# Patient Record
Sex: FEMALE | Race: WHITE | NOT HISPANIC OR LATINO | Employment: OTHER | URBAN - METROPOLITAN AREA
[De-identification: names, ages, dates, MRNs, and addresses within clinical notes are randomized per-mention and may not be internally consistent; named-entity substitution may affect disease eponyms.]

---

## 2018-03-21 ENCOUNTER — NEW PATIENT COMPREHENSIVE (OUTPATIENT)
Dept: URBAN - METROPOLITAN AREA CLINIC 35 | Facility: CLINIC | Age: 73
End: 2018-03-21

## 2018-03-21 DIAGNOSIS — H25.041: ICD-10-CM

## 2018-03-21 DIAGNOSIS — H25.13: ICD-10-CM

## 2018-03-21 DIAGNOSIS — H43.812: ICD-10-CM

## 2018-03-21 DIAGNOSIS — H43.811: ICD-10-CM

## 2018-03-21 PROCEDURE — 1036F TOBACCO NON-USER: CPT

## 2018-03-21 PROCEDURE — 92310-2 LEVEL 2 CONTACT LENS MANAGEMENT

## 2018-03-21 PROCEDURE — 92015 DETERMINE REFRACTIVE STATE: CPT

## 2018-03-21 PROCEDURE — 92134 CPTRZ OPH DX IMG PST SGM RTA: CPT

## 2018-03-21 PROCEDURE — 99204 OFFICE O/P NEW MOD 45 MIN: CPT

## 2018-03-21 PROCEDURE — G8428 CUR MEDS NOT DOCUMENT: HCPCS

## 2018-03-21 ASSESSMENT — VISUAL ACUITY
OS_SC: 20/50-2
OD_CC: 20/30-2
OS_CC: 20/60+2
OS_CC: J3
OS_PH: 20/40
OD_CC: J6
OD_SC: 20/100

## 2018-03-21 ASSESSMENT — KERATOMETRY
OS_K1POWER_DIOPTERS: 44.00
OD_K2POWER_DIOPTERS: 44.25
OD_K1POWER_DIOPTERS: 43.50
OS_AXISANGLE2_DEGREES: 107
OD_AXISANGLE2_DEGREES: 74
OS_K2POWER_DIOPTERS: 44.50

## 2018-03-21 ASSESSMENT — TONOMETRY
OD_IOP_MMHG: 14
OS_IOP_MMHG: 12

## 2018-03-30 ENCOUNTER — RX CHANGE - NO APPT (OUTPATIENT)
Dept: URBAN - METROPOLITAN AREA CLINIC 35 | Facility: CLINIC | Age: 73
End: 2018-03-30

## 2018-03-30 ASSESSMENT — KERATOMETRY
OS_K1POWER_DIOPTERS: 44.00
OD_AXISANGLE2_DEGREES: 74
OD_K1POWER_DIOPTERS: 43.50
OS_AXISANGLE2_DEGREES: 107
OS_K2POWER_DIOPTERS: 44.50
OD_K2POWER_DIOPTERS: 44.25

## 2019-04-09 ENCOUNTER — ESTABLISHED COMPREHENSIVE EXAM (OUTPATIENT)
Dept: URBAN - METROPOLITAN AREA CLINIC 35 | Facility: CLINIC | Age: 74
End: 2019-04-09

## 2019-04-09 DIAGNOSIS — H25.13: ICD-10-CM

## 2019-04-09 DIAGNOSIS — H52.4: ICD-10-CM

## 2019-04-09 DIAGNOSIS — H52.03: ICD-10-CM

## 2019-04-09 DIAGNOSIS — H43.811: ICD-10-CM

## 2019-04-09 DIAGNOSIS — H43.812: ICD-10-CM

## 2019-04-09 PROCEDURE — 92134 CPTRZ OPH DX IMG PST SGM RTA: CPT

## 2019-04-09 PROCEDURE — 92015 DETERMINE REFRACTIVE STATE: CPT

## 2019-04-09 PROCEDURE — 92310-1 LEVEL 1 CONTACT LENS MANAGEMENT

## 2019-04-09 PROCEDURE — 92014 COMPRE OPH EXAM EST PT 1/>: CPT

## 2019-04-09 ASSESSMENT — VISUAL ACUITY
OD_CC: J1
OD_SC: 20/100+1
OS_CC: 20/100-1
OS_BAT: 20/100
OD_CC: 20/60
OS_PH: 20/40-2
OS_SC: 20/40
OS_CC: J1
OD_BAT: 20/100

## 2019-04-09 ASSESSMENT — TONOMETRY
OS_IOP_MMHG: 16
OD_IOP_MMHG: 16

## 2019-04-09 ASSESSMENT — KERATOMETRY
OD_AXISANGLE2_DEGREES: 74
OS_AXISANGLE2_DEGREES: 107
OD_K1POWER_DIOPTERS: 43.50
OS_K1POWER_DIOPTERS: 44.00
OS_K2POWER_DIOPTERS: 44.50
OD_K2POWER_DIOPTERS: 44.25

## 2019-07-15 ENCOUNTER — APPOINTMENT (RX ONLY)
Dept: RURAL CLINIC 1 | Facility: CLINIC | Age: 74
Setting detail: DERMATOLOGY
End: 2019-07-15

## 2019-07-15 DIAGNOSIS — L57.0 ACTINIC KERATOSIS: ICD-10-CM

## 2019-07-15 DIAGNOSIS — D22 MELANOCYTIC NEVI: ICD-10-CM

## 2019-07-15 DIAGNOSIS — Z71.89 OTHER SPECIFIED COUNSELING: ICD-10-CM

## 2019-07-15 DIAGNOSIS — Z85.828 PERSONAL HISTORY OF OTHER MALIGNANT NEOPLASM OF SKIN: ICD-10-CM

## 2019-07-15 DIAGNOSIS — L82.1 OTHER SEBORRHEIC KERATOSIS: ICD-10-CM

## 2019-07-15 DIAGNOSIS — L81.4 OTHER MELANIN HYPERPIGMENTATION: ICD-10-CM

## 2019-07-15 DIAGNOSIS — D18.0 HEMANGIOMA: ICD-10-CM

## 2019-07-15 DIAGNOSIS — L85.3 XEROSIS CUTIS: ICD-10-CM

## 2019-07-15 PROBLEM — D22.72 MELANOCYTIC NEVI OF LEFT LOWER LIMB, INCLUDING HIP: Status: ACTIVE | Noted: 2019-07-15

## 2019-07-15 PROBLEM — D18.01 HEMANGIOMA OF SKIN AND SUBCUTANEOUS TISSUE: Status: ACTIVE | Noted: 2019-07-15

## 2019-07-15 PROBLEM — D22.71 MELANOCYTIC NEVI OF RIGHT LOWER LIMB, INCLUDING HIP: Status: ACTIVE | Noted: 2019-07-15

## 2019-07-15 PROBLEM — I10 ESSENTIAL (PRIMARY) HYPERTENSION: Status: ACTIVE | Noted: 2019-07-15

## 2019-07-15 PROBLEM — E13.9 OTHER SPECIFIED DIABETES MELLITUS WITHOUT COMPLICATIONS: Status: ACTIVE | Noted: 2019-07-15

## 2019-07-15 PROBLEM — D22.39 MELANOCYTIC NEVI OF OTHER PARTS OF FACE: Status: ACTIVE | Noted: 2019-07-15

## 2019-07-15 PROBLEM — D22.62 MELANOCYTIC NEVI OF LEFT UPPER LIMB, INCLUDING SHOULDER: Status: ACTIVE | Noted: 2019-07-15

## 2019-07-15 PROBLEM — D22.5 MELANOCYTIC NEVI OF TRUNK: Status: ACTIVE | Noted: 2019-07-15

## 2019-07-15 PROCEDURE — 99203 OFFICE O/P NEW LOW 30 MIN: CPT | Mod: 25

## 2019-07-15 PROCEDURE — ? LIQUID NITROGEN

## 2019-07-15 PROCEDURE — ? COUNSELING

## 2019-07-15 PROCEDURE — 17004 DESTROY PREMAL LESIONS 15/>: CPT

## 2019-07-15 PROCEDURE — ? INVENTORY

## 2019-07-15 PROCEDURE — ? TREATMENT REGIMEN

## 2019-07-15 ASSESSMENT — LOCATION ZONE DERM
LOCATION ZONE: FACE
LOCATION ZONE: HAND
LOCATION ZONE: LEG
LOCATION ZONE: NOSE
LOCATION ZONE: ARM
LOCATION ZONE: EAR
LOCATION ZONE: TRUNK

## 2019-07-15 ASSESSMENT — LOCATION SIMPLE DESCRIPTION DERM
LOCATION SIMPLE: RIGHT FOREHEAD
LOCATION SIMPLE: RIGHT HAND
LOCATION SIMPLE: LEFT UPPER ARM
LOCATION SIMPLE: LEFT CLAVICULAR SKIN
LOCATION SIMPLE: CHEST
LOCATION SIMPLE: LEFT EAR
LOCATION SIMPLE: UPPER BACK
LOCATION SIMPLE: RIGHT FOREARM
LOCATION SIMPLE: LEFT CHEEK
LOCATION SIMPLE: LEFT THIGH
LOCATION SIMPLE: RIGHT WRIST
LOCATION SIMPLE: RIGHT THIGH
LOCATION SIMPLE: RIGHT UPPER BACK
LOCATION SIMPLE: RIGHT UPPER ARM
LOCATION SIMPLE: RIGHT LOWER BACK
LOCATION SIMPLE: LEFT ELBOW
LOCATION SIMPLE: ABDOMEN
LOCATION SIMPLE: RIGHT PRETIBIAL REGION
LOCATION SIMPLE: RIGHT EAR
LOCATION SIMPLE: LEFT FOREARM
LOCATION SIMPLE: LEFT FOREHEAD
LOCATION SIMPLE: LEFT LOWER BACK
LOCATION SIMPLE: RIGHT POSTERIOR UPPER ARM
LOCATION SIMPLE: NOSE
LOCATION SIMPLE: LEFT PRETIBIAL REGION

## 2019-07-15 ASSESSMENT — LOCATION DETAILED DESCRIPTION DERM
LOCATION DETAILED: RIGHT RADIAL DORSAL HAND
LOCATION DETAILED: RIGHT ANTERIOR DISTAL UPPER ARM
LOCATION DETAILED: RIGHT MEDIAL DORSAL WRIST
LOCATION DETAILED: LEFT INFERIOR CENTRAL MALAR CHEEK
LOCATION DETAILED: RIGHT DORSAL WRIST
LOCATION DETAILED: LEFT MEDIAL MALAR CHEEK
LOCATION DETAILED: INFERIOR THORACIC SPINE
LOCATION DETAILED: RIGHT SUPERIOR UPPER BACK
LOCATION DETAILED: LEFT CLAVICULAR SKIN
LOCATION DETAILED: RIGHT SUPERIOR MEDIAL MIDBACK
LOCATION DETAILED: UPPER STERNUM
LOCATION DETAILED: LEFT SUPERIOR MEDIAL FOREHEAD
LOCATION DETAILED: RIGHT SUPERIOR FOREHEAD
LOCATION DETAILED: LEFT SUPERIOR HELIX
LOCATION DETAILED: RIGHT ANTERIOR PROXIMAL THIGH
LOCATION DETAILED: RIGHT DISTAL DORSAL FOREARM
LOCATION DETAILED: LEFT ELBOW
LOCATION DETAILED: LEFT NASAL DORSUM
LOCATION DETAILED: LEFT PROXIMAL PRETIBIAL REGION
LOCATION DETAILED: RIGHT PROXIMAL DORSAL FOREARM
LOCATION DETAILED: LEFT SUPERIOR LATERAL MIDBACK
LOCATION DETAILED: RIGHT DISTAL RADIAL DORSAL FOREARM
LOCATION DETAILED: LEFT ANTERIOR DISTAL THIGH
LOCATION DETAILED: RIGHT PROXIMAL PRETIBIAL REGION
LOCATION DETAILED: RIGHT RIB CAGE
LOCATION DETAILED: LEFT CENTRAL MALAR CHEEK
LOCATION DETAILED: RIGHT ANTERIOR DISTAL THIGH
LOCATION DETAILED: LEFT ANTERIOR PROXIMAL THIGH
LOCATION DETAILED: RIGHT DISTAL LATERAL POSTERIOR UPPER ARM
LOCATION DETAILED: LEFT ANTERIOR DISTAL UPPER ARM
LOCATION DETAILED: RIGHT DISTAL PRETIBIAL REGION
LOCATION DETAILED: LEFT LATERAL ABDOMEN
LOCATION DETAILED: LEFT SUPERIOR NASAL CHEEK
LOCATION DETAILED: LEFT RIB CAGE
LOCATION DETAILED: RIGHT LATERAL SUPERIOR CHEST
LOCATION DETAILED: LEFT MEDIAL SUPERIOR CHEST
LOCATION DETAILED: LEFT PROXIMAL DORSAL FOREARM
LOCATION DETAILED: RIGHT SUPERIOR HELIX
LOCATION DETAILED: RIGHT SUPERIOR MEDIAL UPPER BACK
LOCATION DETAILED: LEFT INFERIOR MEDIAL MALAR CHEEK
LOCATION DETAILED: RIGHT INFERIOR FOREHEAD

## 2019-09-03 NOTE — PROCEDURE: TREATMENT REGIMEN
Plan: Discussed Efudex when the pt returns in May
Detail Level: Detailed
Continue Regimen: Dove soap\\nMoisturizer qd
Specialty Care

## 2020-12-30 ENCOUNTER — ESTABLISHED COMPREHENSIVE EXAM (OUTPATIENT)
Dept: URBAN - METROPOLITAN AREA CLINIC 35 | Facility: CLINIC | Age: 75
End: 2020-12-30

## 2020-12-30 DIAGNOSIS — H43.813: ICD-10-CM

## 2020-12-30 DIAGNOSIS — H35.361: ICD-10-CM

## 2020-12-30 DIAGNOSIS — H52.4: ICD-10-CM

## 2020-12-30 DIAGNOSIS — H25.13: ICD-10-CM

## 2020-12-30 PROCEDURE — 92134 CPTRZ OPH DX IMG PST SGM RTA: CPT

## 2020-12-30 PROCEDURE — 92250 FUNDUS PHOTOGRAPHY W/I&R: CPT

## 2020-12-30 PROCEDURE — 92015 DETERMINE REFRACTIVE STATE: CPT

## 2020-12-30 PROCEDURE — 92014 COMPRE OPH EXAM EST PT 1/>: CPT

## 2020-12-30 ASSESSMENT — KERATOMETRY
OS_AXISANGLE2_DEGREES: 107
OS_K1POWER_DIOPTERS: 44.00
OS_K2POWER_DIOPTERS: 44.50
OD_K1POWER_DIOPTERS: 43.50
OD_K2POWER_DIOPTERS: 44.25
OD_AXISANGLE2_DEGREES: 74

## 2020-12-30 ASSESSMENT — VISUAL ACUITY
OS_PH: 20/40
OD_SC: J8
OS_SC: J8
OS_SC: 20/200
OD_PH: 20/40
OD_SC: 20/100

## 2020-12-30 ASSESSMENT — TONOMETRY
OS_IOP_MMHG: 15
OD_IOP_MMHG: 16

## 2021-01-18 ASSESSMENT — KERATOMETRY
OD_K1POWER_DIOPTERS: 43.50
OS_AXISANGLE2_DEGREES: 107
OS_K2POWER_DIOPTERS: 44.50
OS_K1POWER_DIOPTERS: 44.00
OD_AXISANGLE2_DEGREES: 74
OD_K2POWER_DIOPTERS: 44.25

## 2021-01-19 ENCOUNTER — CATARACT CONSULT (OUTPATIENT)
Dept: URBAN - METROPOLITAN AREA CLINIC 35 | Facility: CLINIC | Age: 76
End: 2021-01-19

## 2021-01-19 DIAGNOSIS — H25.812: ICD-10-CM

## 2021-01-19 DIAGNOSIS — H35.361: ICD-10-CM

## 2021-01-19 DIAGNOSIS — H25.811: ICD-10-CM

## 2021-01-19 DIAGNOSIS — H43.813: ICD-10-CM

## 2021-01-19 PROCEDURE — 92136TC INTERFEROMETRY - TECHNICAL COMPONENT

## 2021-01-19 PROCEDURE — V2799PMN IMPRIMIS PRED-MOXI-NEPAF 5ML

## 2021-01-19 PROCEDURE — 92025-2 CORNEAL TOPOGRAPHY, PT

## 2021-01-19 PROCEDURE — 92014 COMPRE OPH EXAM EST PT 1/>: CPT

## 2021-01-19 ASSESSMENT — VISUAL ACUITY
OD_SC: J8
OS_SC: J8
OD_RAM: 20/20-1
OS_AM: 20/25+2
OD_SC: 20/100
OS_SC: 20/200

## 2021-01-19 ASSESSMENT — TONOMETRY
OD_IOP_MMHG: 12
OS_IOP_MMHG: 12

## 2021-01-27 ASSESSMENT — KERATOMETRY
OD_K1POWER_DIOPTERS: 43.50
OS_AXISANGLE2_DEGREES: 107
OS_K1POWER_DIOPTERS: 44.00
OD_AXISANGLE2_DEGREES: 74
OD_K2POWER_DIOPTERS: 44.25
OS_K2POWER_DIOPTERS: 44.50

## 2021-01-28 ENCOUNTER — H&P (OUTPATIENT)
Dept: URBAN - METROPOLITAN AREA CLINIC 39 | Facility: CLINIC | Age: 76
End: 2021-01-28

## 2021-01-28 ENCOUNTER — SURGERY/PROCEDURE (OUTPATIENT)
Dept: URBAN - METROPOLITAN AREA CLINIC 35 | Facility: CLINIC | Age: 76
End: 2021-01-28

## 2021-01-28 DIAGNOSIS — H25.811: ICD-10-CM

## 2021-01-28 DIAGNOSIS — H25.812: ICD-10-CM

## 2021-01-28 PROCEDURE — 99211T TECH SERVICE

## 2021-01-28 PROCEDURE — 65772LRI LRI DURING CAT SX

## 2021-01-28 PROCEDURE — 66999LNSR LENSAR LASER FOR CAT SX

## 2021-01-28 PROCEDURE — 66984CV REMOVE CATARACT, INSERT LENS, CUSTOM VISION

## 2021-01-28 ASSESSMENT — KERATOMETRY
OD_K1POWER_DIOPTERS: 43.50
OS_K1POWER_DIOPTERS: 44.00
OD_K2POWER_DIOPTERS: 44.25
OS_AXISANGLE2_DEGREES: 107
OD_AXISANGLE2_DEGREES: 74
OS_K2POWER_DIOPTERS: 44.50

## 2021-01-29 ENCOUNTER — 1 DAY POST-OP (OUTPATIENT)
Dept: URBAN - METROPOLITAN AREA CLINIC 35 | Facility: CLINIC | Age: 76
End: 2021-01-29

## 2021-01-29 DIAGNOSIS — Z96.1: ICD-10-CM

## 2021-01-29 PROCEDURE — 99024 POSTOP FOLLOW-UP VISIT: CPT

## 2021-01-29 ASSESSMENT — VISUAL ACUITY
OD_PH: 20/30-2
OD_SC: 20/40-1
OS_SC: 20/80

## 2021-01-29 ASSESSMENT — KERATOMETRY
OD_K1POWER_DIOPTERS: 43.50
OS_AXISANGLE2_DEGREES: 107
OD_K2POWER_DIOPTERS: 44.25
OS_K1POWER_DIOPTERS: 44.00
OD_AXISANGLE2_DEGREES: 74
OS_K2POWER_DIOPTERS: 44.50

## 2021-01-29 ASSESSMENT — TONOMETRY
OS_IOP_MMHG: 16
OD_IOP_MMHG: 18

## 2021-02-03 ASSESSMENT — KERATOMETRY
OD_AXISANGLE2_DEGREES: 74
OS_K2POWER_DIOPTERS: 44.50
OD_K1POWER_DIOPTERS: 43.50
OS_K1POWER_DIOPTERS: 44.00
OD_K2POWER_DIOPTERS: 44.25
OS_AXISANGLE2_DEGREES: 107

## 2021-02-04 ENCOUNTER — POST OP/EVAL OF SECOND EYE (OUTPATIENT)
Dept: URBAN - METROPOLITAN AREA CLINIC 39 | Facility: CLINIC | Age: 76
End: 2021-02-04

## 2021-02-04 ENCOUNTER — SURGERY/PROCEDURE (OUTPATIENT)
Dept: URBAN - METROPOLITAN AREA CLINIC 35 | Facility: CLINIC | Age: 76
End: 2021-02-04

## 2021-02-04 DIAGNOSIS — H25.812: ICD-10-CM

## 2021-02-04 DIAGNOSIS — Z96.1: ICD-10-CM

## 2021-02-04 PROCEDURE — 66984CV REMOVE CATARACT, INSERT LENS, CUSTOM VISION

## 2021-02-04 PROCEDURE — 66999LNSR LENSAR LASER FOR CAT SX

## 2021-02-04 PROCEDURE — 99213 OFFICE O/P EST LOW 20 MIN: CPT

## 2021-02-04 PROCEDURE — 65772LRI LRI DURING CAT SX

## 2021-02-04 ASSESSMENT — VISUAL ACUITY
OS_SC: 20/80
OS_BAT: 20/100 WITH MR
OD_SC: 20/30+2

## 2021-02-04 ASSESSMENT — TONOMETRY
OD_IOP_MMHG: 12
OS_IOP_MMHG: 11

## 2021-02-04 ASSESSMENT — KERATOMETRY
OS_K1POWER_DIOPTERS: 44.00
OD_K2POWER_DIOPTERS: 44.25
OD_K1POWER_DIOPTERS: 43.50
OD_AXISANGLE2_DEGREES: 74
OS_AXISANGLE2_DEGREES: 107
OS_K2POWER_DIOPTERS: 44.50

## 2021-02-05 ENCOUNTER — CATARACT POST-OP 1-DAY (OUTPATIENT)
Dept: URBAN - METROPOLITAN AREA CLINIC 35 | Facility: CLINIC | Age: 76
End: 2021-02-05

## 2021-02-05 DIAGNOSIS — Z96.1: ICD-10-CM

## 2021-02-05 PROCEDURE — 99024 POSTOP FOLLOW-UP VISIT: CPT

## 2021-02-05 ASSESSMENT — TONOMETRY
OS_IOP_MMHG: 18
OD_IOP_MMHG: 18

## 2021-02-05 ASSESSMENT — VISUAL ACUITY
OS_SC: 20/40+2
OD_SC: 20/30

## 2021-02-05 ASSESSMENT — KERATOMETRY
OS_AXISANGLE2_DEGREES: 107
OD_AXISANGLE2_DEGREES: 74
OS_K2POWER_DIOPTERS: 44.50
OD_K1POWER_DIOPTERS: 43.50
OD_K2POWER_DIOPTERS: 44.25
OS_K1POWER_DIOPTERS: 44.00

## 2021-02-26 ENCOUNTER — POST-OP CATARACT (OUTPATIENT)
Dept: URBAN - METROPOLITAN AREA CLINIC 35 | Facility: CLINIC | Age: 76
End: 2021-02-26

## 2021-02-26 DIAGNOSIS — Z96.1: ICD-10-CM

## 2021-02-26 PROCEDURE — 99024 POSTOP FOLLOW-UP VISIT: CPT

## 2021-02-26 ASSESSMENT — TONOMETRY
OS_IOP_MMHG: 14
OD_IOP_MMHG: 15

## 2021-02-26 ASSESSMENT — VISUAL ACUITY
OD_SC: 20/25-2
OS_SC: 20/25

## 2021-02-26 ASSESSMENT — KERATOMETRY
OS_K2POWER_DIOPTERS: 44.50
OS_AXISANGLE2_DEGREES: 107
OS_K1POWER_DIOPTERS: 44.00
OD_K1POWER_DIOPTERS: 43.50
OD_K2POWER_DIOPTERS: 44.25
OD_AXISANGLE2_DEGREES: 74